# Patient Record
(demographics unavailable — no encounter records)

---

## 2025-03-28 NOTE — IMAGING
[de-identified] : Knee Exam: Inspection: Mild effusion, no warmth, no ecchymosis Palpation: Medial joint line tenderness to palpation Range of motion 0-130 Strength: 5/5 quadriceps and hamstring strength Positive [facility] Stability: No varus or valgus instability, negative lachman; negative mert Motor and sensory intact distally Gait: Mildly antalgic gait [Left] : left knee [All Views] : anteroposterior, lateral, skyline, and anteroposterior standing [Degenerative change] : Degenerative change

## 2025-03-28 NOTE — ASSESSMENT
[FreeTextEntry1] : Medial joint pain and intermittent mechanical symptoms [place].  Her x-rays are essentially unremarkable with well-preserved tibiofemoral space.  Mild patellofemoral narrowing noted.  I do not have an explanation for the persistent joint line symptoms.  An MRI is advised to rule out medial meniscal tear.  Prescription meloxicam 15 mg sent to the pharmacy for maintenance therapy as needed.  Side effects reviewed.  Prognosis uncertain.  We briefly discussed arthroscopic surgery if meniscus is torn.  No risky activity in the interim.  The patient's current medication management of their orthopedic diagnosis was reviewed today: There is a moderate risk of morbidity with further treatment, especially from use of prescription strength medications and possible side effects of these medications which include upset stomach with oral medications, skin reactions to topical medications and cardiac/renal/diabetes issues with long term use.

## 2025-03-28 NOTE — HISTORY OF PRESENT ILLNESS
[de-identified] : 03/28/2025: Patient presents today for a new injury visit for the L knee. Patient states she started getting pain 1 [place] ago [place] No treatment to date.

## 2025-04-11 NOTE — IMAGING
[de-identified] : Knee Exam: Inspection: Mild effusion, no warmth, no ecchymosis Palpation: Medial joint line tenderness to palpation Range of motion 0-130 Strength: 5/5 quadriceps and hamstring strength Positive [facility] Stability: No varus or valgus instability, negative lachman; negative mert Motor and sensory intact distally Gait: Mildly antalgic gait

## 2025-04-11 NOTE — HISTORY OF PRESENT ILLNESS
[de-identified] : 03/28/2025: Patient presents today for a new injury visit for the L knee. Patient states she [place] getting [place] 1 [place] No treatment to date.   04/11/2025: here to review mri results of the L knee done on 4/4/25

## 2025-04-11 NOTE — ASSESSMENT
[FreeTextEntry1] : I personally reviewed and interpreted the MRI images in detail.  Flap tear in the mid body of the medial meniscus with minimal tibiofemoral arthritis.  She still very symptomatic from this after a traumatic injury.  Risk benefits and returns for arthroscopic partial medial meniscectomy reviewed.  She will continue with the meloxicam 15 mg prescription in the interim.  No risky activity until surgery.  Postoperative course outlined.  The patient's current medication management of their orthopedic diagnosis was reviewed today: There is a moderate risk of morbidity with further treatment, especially from use of prescription strength medications and possible side effects of these medications which include upset stomach with oral medications, skin reactions to topical medications and cardiac/renal/diabetes issues with long term use.